# Patient Record
Sex: FEMALE | URBAN - METROPOLITAN AREA
[De-identification: names, ages, dates, MRNs, and addresses within clinical notes are randomized per-mention and may not be internally consistent; named-entity substitution may affect disease eponyms.]

---

## 2019-04-24 ENCOUNTER — APPOINTMENT (RX ONLY)
Dept: URBAN - METROPOLITAN AREA CLINIC 7 | Facility: CLINIC | Age: 39
Setting detail: DERMATOLOGY
End: 2019-04-24

## 2019-04-24 DIAGNOSIS — D18.0 HEMANGIOMA: ICD-10-CM

## 2019-04-24 DIAGNOSIS — Z87.2 PERSONAL HISTORY OF DISEASES OF THE SKIN AND SUBCUTANEOUS TISSUE: ICD-10-CM

## 2019-04-24 DIAGNOSIS — L81.4 OTHER MELANIN HYPERPIGMENTATION: ICD-10-CM

## 2019-04-24 DIAGNOSIS — L85.3 XEROSIS CUTIS: ICD-10-CM

## 2019-04-24 DIAGNOSIS — L57.3 POIKILODERMA OF CIVATTE: ICD-10-CM

## 2019-04-24 DIAGNOSIS — Q826 OTHER SPECIFIED ANOMALIES OF SKIN: ICD-10-CM

## 2019-04-24 DIAGNOSIS — Q828 OTHER SPECIFIED ANOMALIES OF SKIN: ICD-10-CM

## 2019-04-24 DIAGNOSIS — Z71.89 OTHER SPECIFIED COUNSELING: ICD-10-CM

## 2019-04-24 DIAGNOSIS — L70.8 OTHER ACNE: ICD-10-CM

## 2019-04-24 DIAGNOSIS — L98.8 OTHER SPECIFIED DISORDERS OF THE SKIN AND SUBCUTANEOUS TISSUE: ICD-10-CM

## 2019-04-24 DIAGNOSIS — Q819 OTHER SPECIFIED ANOMALIES OF SKIN: ICD-10-CM

## 2019-04-24 PROBLEM — Q82.8 OTHER SPECIFIED CONGENITAL MALFORMATIONS OF SKIN: Status: ACTIVE | Noted: 2019-04-24

## 2019-04-24 PROBLEM — D18.01 HEMANGIOMA OF SKIN AND SUBCUTANEOUS TISSUE: Status: ACTIVE | Noted: 2019-04-24

## 2019-04-24 PROBLEM — L70.0 ACNE VULGARIS: Status: ACTIVE | Noted: 2019-04-24

## 2019-04-24 PROCEDURE — ? EDUCATIONAL RESOURCES PROVIDED

## 2019-04-24 PROCEDURE — ? TREATMENT REGIMEN

## 2019-04-24 PROCEDURE — ? COUNSELING

## 2019-04-24 PROCEDURE — ? SUNSCREEN RECOMMENDATIONS

## 2019-04-24 PROCEDURE — 99203 OFFICE O/P NEW LOW 30 MIN: CPT

## 2019-04-24 PROCEDURE — ? MEDICAL CONSULTATION: DYNAMIC RHYTIDES

## 2019-04-24 PROCEDURE — ? PRESCRIPTION

## 2019-04-24 PROCEDURE — ? PRESCRIPTION SAMPLES PROVIDED

## 2019-04-24 RX ORDER — TRETINOIN 0.5 MG/G
1 CREAM TOPICAL QHS
Qty: 1 | Refills: 3 | Status: ERX | COMMUNITY
Start: 2019-04-24

## 2019-04-24 RX ORDER — TRETINOIN 0.25 MG/G
CREAM TOPICAL QHS
Qty: 1 | Refills: 6 | Status: CANCELLED

## 2019-04-24 RX ORDER — DAPSONE 75 MG/G
1 GEL TOPICAL DAILY
Qty: 1 | Refills: 5 | Status: ERX | COMMUNITY
Start: 2019-04-24

## 2019-04-24 RX ADMIN — DAPSONE 1: 75 GEL TOPICAL at 15:02

## 2019-04-24 RX ADMIN — TRETINOIN 1: 0.5 CREAM TOPICAL at 15:23

## 2019-04-24 ASSESSMENT — LOCATION SIMPLE DESCRIPTION DERM
LOCATION SIMPLE: LEFT FOREARM
LOCATION SIMPLE: LEFT THIGH
LOCATION SIMPLE: RIGHT FOREARM
LOCATION SIMPLE: LEFT POSTERIOR UPPER ARM
LOCATION SIMPLE: LEFT CHEEK
LOCATION SIMPLE: LEFT UPPER BACK
LOCATION SIMPLE: ABDOMEN
LOCATION SIMPLE: LEFT EYELID
LOCATION SIMPLE: RIGHT TEMPLE
LOCATION SIMPLE: RIGHT POSTERIOR UPPER ARM
LOCATION SIMPLE: CHEST
LOCATION SIMPLE: RIGHT CHEEK
LOCATION SIMPLE: RIGHT THIGH

## 2019-04-24 ASSESSMENT — LOCATION DETAILED DESCRIPTION DERM
LOCATION DETAILED: LEFT SUPERIOR MEDIAL UPPER BACK
LOCATION DETAILED: RIGHT INFERIOR TEMPLE
LOCATION DETAILED: LEFT ANTERIOR PROXIMAL THIGH
LOCATION DETAILED: RIGHT PROXIMAL POSTERIOR UPPER ARM
LOCATION DETAILED: LEFT ANTERIOR DISTAL THIGH
LOCATION DETAILED: LEFT LATERAL CANTHUS
LOCATION DETAILED: LEFT LATERAL BUCCAL CHEEK
LOCATION DETAILED: LEFT DISTAL DORSAL FOREARM
LOCATION DETAILED: LEFT DISTAL POSTERIOR UPPER ARM
LOCATION DETAILED: RIGHT DISTAL LATERAL POSTERIOR UPPER ARM
LOCATION DETAILED: LEFT PROXIMAL POSTERIOR UPPER ARM
LOCATION DETAILED: RIGHT CENTRAL BUCCAL CHEEK
LOCATION DETAILED: RIGHT DISTAL DORSAL FOREARM
LOCATION DETAILED: UPPER STERNUM
LOCATION DETAILED: MIDDLE STERNUM
LOCATION DETAILED: EPIGASTRIC SKIN
LOCATION DETAILED: RIGHT ANTERIOR DISTAL THIGH

## 2019-04-24 ASSESSMENT — LOCATION ZONE DERM
LOCATION ZONE: ARM
LOCATION ZONE: TRUNK
LOCATION ZONE: EYELID
LOCATION ZONE: LEG
LOCATION ZONE: FACE

## 2019-04-24 NOTE — PROCEDURE: SUNSCREEN RECOMMENDATIONS

## 2019-04-24 NOTE — PROCEDURE: PRESCRIPTION SAMPLES PROVIDED
Detail Level: Zone
Samples Given: Aczone 7.5%
Lot/Batch Number (Optional): [id number]
Expiration Date (Optional): 06/2019

## 2019-04-24 NOTE — PROCEDURE: TREATMENT REGIMEN
Continue Regimen: RA 0.05% or Amlactin qhs
Detail Level: Zone
Plan: Stressed sun protection w/ sunscreen.

## 2019-05-22 ENCOUNTER — APPOINTMENT (RX ONLY)
Dept: URBAN - METROPOLITAN AREA CLINIC 7 | Facility: CLINIC | Age: 39
Setting detail: DERMATOLOGY
End: 2019-05-22

## 2019-05-22 DIAGNOSIS — Z71.89 OTHER SPECIFIED COUNSELING: ICD-10-CM

## 2019-05-22 DIAGNOSIS — Z41.9 ENCOUNTER FOR PROCEDURE FOR PURPOSES OTHER THAN REMEDYING HEALTH STATE, UNSPECIFIED: ICD-10-CM

## 2019-05-22 PROCEDURE — ? COUNSELING

## 2019-05-22 PROCEDURE — ? BOTOX

## 2019-05-22 ASSESSMENT — LOCATION SIMPLE DESCRIPTION DERM
LOCATION SIMPLE: RIGHT FOREHEAD
LOCATION SIMPLE: RIGHT CHEEK
LOCATION SIMPLE: RIGHT TEMPLE
LOCATION SIMPLE: NOSE
LOCATION SIMPLE: LEFT FOREHEAD
LOCATION SIMPLE: LEFT TEMPLE

## 2019-05-22 ASSESSMENT — LOCATION DETAILED DESCRIPTION DERM
LOCATION DETAILED: LEFT INFERIOR FOREHEAD
LOCATION DETAILED: LEFT INFERIOR TEMPLE
LOCATION DETAILED: RIGHT INFERIOR TEMPLE
LOCATION DETAILED: NASAL DORSUM
LOCATION DETAILED: RIGHT INFERIOR FOREHEAD
LOCATION DETAILED: RIGHT INFERIOR CENTRAL MALAR CHEEK

## 2019-05-22 ASSESSMENT — LOCATION ZONE DERM
LOCATION ZONE: NOSE
LOCATION ZONE: FACE

## 2019-05-22 NOTE — PROCEDURE: BOTOX
Levator Labii Superioris Units: 0
Expiration Date (Month Year): 6/25/21
Additional Area 1 Units: 2
Detail Level: Zone
Additional Area 1 Location: Lateral brows
Consent: Written consent obtained. Risks include but not limited to lid/brow ptosis, bruising, swelling, diplopia, temporary effect, incomplete chemical denervation.
Lot #: [id number]
Forehead Units: 15
Price (Use Numbers Only, No Special Characters Or $): 0.0
Dilution (U/0.1 Cc): 5
Post-Care Instructions: Patient instructed to not lie down for 4 hours and limit physical activity for 24 hours. Patient instructed not to travel by airplane for 48 hours.
Expiration Date (Month Year): 08/2021
Lot #: [id number]
Additional Area 1 Location: upper lip
Price (Use Numbers Only, No Special Characters Or $): 26.0

## 2019-06-12 ENCOUNTER — APPOINTMENT (RX ONLY)
Dept: URBAN - METROPOLITAN AREA CLINIC 7 | Facility: CLINIC | Age: 39
Setting detail: DERMATOLOGY
End: 2019-06-12

## 2019-06-12 DIAGNOSIS — L98.8 OTHER SPECIFIED DISORDERS OF THE SKIN AND SUBCUTANEOUS TISSUE: ICD-10-CM

## 2019-06-12 PROCEDURE — ? COUNSELING

## 2019-06-12 PROCEDURE — ? COSMETIC FOLLOW-UP

## 2019-06-12 ASSESSMENT — LOCATION DETAILED DESCRIPTION DERM
LOCATION DETAILED: RIGHT INFERIOR TEMPLE
LOCATION DETAILED: INFERIOR MID FOREHEAD
LOCATION DETAILED: LEFT INFERIOR TEMPLE
LOCATION DETAILED: GLABELLA

## 2019-06-12 ASSESSMENT — LOCATION SIMPLE DESCRIPTION DERM
LOCATION SIMPLE: INFERIOR FOREHEAD
LOCATION SIMPLE: LEFT TEMPLE
LOCATION SIMPLE: RIGHT TEMPLE
LOCATION SIMPLE: GLABELLA

## 2019-06-12 ASSESSMENT — LOCATION ZONE DERM: LOCATION ZONE: FACE

## 2019-06-12 NOTE — PROCEDURE: COSMETIC FOLLOW-UP
Detail Level: Zone
Side Effects Override (Free Text): Consider forehead injections
Patient Satisfaction: Very pleased
Global Improvement: Very Good
Treatment (Optional): Botox

## 2019-07-03 ENCOUNTER — APPOINTMENT (RX ONLY)
Dept: URBAN - METROPOLITAN AREA CLINIC 7 | Facility: CLINIC | Age: 39
Setting detail: DERMATOLOGY
End: 2019-07-03

## 2019-07-03 DIAGNOSIS — Z41.9 ENCOUNTER FOR PROCEDURE FOR PURPOSES OTHER THAN REMEDYING HEALTH STATE, UNSPECIFIED: ICD-10-CM

## 2019-07-03 PROCEDURE — ? FACIAL

## 2019-07-03 ASSESSMENT — LOCATION DETAILED DESCRIPTION DERM
LOCATION DETAILED: RIGHT INFERIOR MEDIAL MALAR CHEEK
LOCATION DETAILED: LEFT INFERIOR CENTRAL MALAR CHEEK

## 2019-07-03 ASSESSMENT — LOCATION SIMPLE DESCRIPTION DERM
LOCATION SIMPLE: RIGHT CHEEK
LOCATION SIMPLE: LEFT CHEEK

## 2019-07-03 ASSESSMENT — LOCATION ZONE DERM: LOCATION ZONE: FACE

## 2019-07-03 NOTE — PROCEDURE: FACIAL
Exfoliation Type: scrub
Facial Steaming: steamed
Extraction Method: extractor
Comments (Sticky): Revision enzyme cleanser, dermapeel, Image peel with vital c mask 3 min with peel, moderate extractions, melanie well, Image mask, CE ferulic, Triple Lipid, Elta spf
Detail Level: Zone

## 2019-07-31 ENCOUNTER — APPOINTMENT (RX ONLY)
Dept: URBAN - METROPOLITAN AREA CLINIC 7 | Facility: CLINIC | Age: 39
Setting detail: DERMATOLOGY
End: 2019-07-31

## 2019-07-31 DIAGNOSIS — Z41.9 ENCOUNTER FOR PROCEDURE FOR PURPOSES OTHER THAN REMEDYING HEALTH STATE, UNSPECIFIED: ICD-10-CM

## 2019-07-31 PROCEDURE — ? FACIAL

## 2019-07-31 ASSESSMENT — LOCATION SIMPLE DESCRIPTION DERM
LOCATION SIMPLE: RIGHT CHEEK
LOCATION SIMPLE: LEFT CHEEK

## 2019-07-31 ASSESSMENT — LOCATION DETAILED DESCRIPTION DERM
LOCATION DETAILED: LEFT CENTRAL MALAR CHEEK
LOCATION DETAILED: RIGHT INFERIOR CENTRAL MALAR CHEEK

## 2019-07-31 ASSESSMENT — LOCATION ZONE DERM: LOCATION ZONE: FACE

## 2019-07-31 NOTE — PROCEDURE: FACIAL
Extraction Method: extractor
Facial Steaming: steamed
Detail Level: Zone
Comments (Sticky): Revision Enzyme cleanser, 3 step Image exfoliation, light microdermabrasion, moderate extractions, phyto mask, CE Ferulic, Epidermal repair, Elta md
Exfoliation Type: scrub

## 2019-08-28 ENCOUNTER — APPOINTMENT (RX ONLY)
Dept: URBAN - METROPOLITAN AREA CLINIC 7 | Facility: CLINIC | Age: 39
Setting detail: DERMATOLOGY
End: 2019-08-28

## 2019-08-28 DIAGNOSIS — Z41.9 ENCOUNTER FOR PROCEDURE FOR PURPOSES OTHER THAN REMEDYING HEALTH STATE, UNSPECIFIED: ICD-10-CM

## 2019-08-28 PROCEDURE — ? FACIAL

## 2019-08-28 ASSESSMENT — LOCATION DETAILED DESCRIPTION DERM
LOCATION DETAILED: RIGHT INFERIOR CENTRAL MALAR CHEEK
LOCATION DETAILED: LEFT UPPER CUTANEOUS LIP

## 2019-08-28 ASSESSMENT — LOCATION ZONE DERM
LOCATION ZONE: LIP
LOCATION ZONE: FACE

## 2019-08-28 ASSESSMENT — LOCATION SIMPLE DESCRIPTION DERM
LOCATION SIMPLE: RIGHT CHEEK
LOCATION SIMPLE: LEFT LIP

## 2019-08-28 NOTE — PROCEDURE: FACIAL
Extraction Method: extractor
Comments (Sticky): Simply clean, dermapeel, [place] enzyme 3 min, melanie well, light microdermabrasion, Image brightening peel, melanie well, image mask, phyto gel, Epidermal repair, Elta clear.
Facial Steaming: steamed
Detail Level: Zone
Comments (Non-Sticky): 3/4 facial pkg
Exfoliation Type: scrub

## 2019-09-18 ENCOUNTER — APPOINTMENT (RX ONLY)
Dept: URBAN - METROPOLITAN AREA CLINIC 27 | Facility: CLINIC | Age: 39
Setting detail: DERMATOLOGY
End: 2019-09-18

## 2019-09-18 DIAGNOSIS — Z41.9 ENCOUNTER FOR PROCEDURE FOR PURPOSES OTHER THAN REMEDYING HEALTH STATE, UNSPECIFIED: ICD-10-CM

## 2019-09-18 PROCEDURE — ? MEDICAL CONSULTATION: FILLERS

## 2019-09-18 PROCEDURE — ? OTHER

## 2019-09-18 PROCEDURE — ? BOTOX

## 2019-09-18 ASSESSMENT — LOCATION SIMPLE DESCRIPTION DERM
LOCATION SIMPLE: RIGHT FOREHEAD
LOCATION SIMPLE: RIGHT TEMPLE
LOCATION SIMPLE: SUPERIOR FOREHEAD
LOCATION SIMPLE: LEFT CHEEK
LOCATION SIMPLE: LEFT EYEBROW
LOCATION SIMPLE: RIGHT EYELID
LOCATION SIMPLE: LEFT FOREHEAD
LOCATION SIMPLE: LEFT EYELID
LOCATION SIMPLE: RIGHT EYEBROW
LOCATION SIMPLE: GLABELLA
LOCATION SIMPLE: RIGHT CHEEK

## 2019-09-18 ASSESSMENT — LOCATION DETAILED DESCRIPTION DERM
LOCATION DETAILED: RIGHT LATERAL FOREHEAD
LOCATION DETAILED: LEFT INFERIOR MEDIAL FOREHEAD
LOCATION DETAILED: LEFT SUPERIOR FOREHEAD
LOCATION DETAILED: RIGHT LATERAL EYEBROW
LOCATION DETAILED: RIGHT MEDIAL EYEBROW
LOCATION DETAILED: RIGHT MEDIAL FOREHEAD
LOCATION DETAILED: RIGHT SUPERIOR MEDIAL FOREHEAD
LOCATION DETAILED: LEFT MEDIAL FOREHEAD
LOCATION DETAILED: RIGHT FOREHEAD
LOCATION DETAILED: LEFT LATERAL EYEBROW
LOCATION DETAILED: RIGHT LATERAL CANTHUS
LOCATION DETAILED: RIGHT SUPERIOR CENTRAL MALAR CHEEK
LOCATION DETAILED: LEFT SUPERIOR CENTRAL MALAR CHEEK
LOCATION DETAILED: RIGHT SUPERIOR FOREHEAD
LOCATION DETAILED: RIGHT CENTRAL EYEBROW
LOCATION DETAILED: LEFT LATERAL CANTHUS
LOCATION DETAILED: RIGHT INFERIOR TEMPLE
LOCATION DETAILED: SUPERIOR MID FOREHEAD
LOCATION DETAILED: LEFT FOREHEAD
LOCATION DETAILED: GLABELLA
LOCATION DETAILED: RIGHT MID TEMPLE

## 2019-09-18 ASSESSMENT — LOCATION ZONE DERM
LOCATION ZONE: FACE
LOCATION ZONE: EYELID

## 2019-09-18 NOTE — PROCEDURE: BOTOX
Masseter Units: 0
Additional Area 2 Location: crows feet
Price (Use Numbers Only, No Special Characters Or $): 180
Lot #: [id number]
Additional Area 1 Location: lateral brow
Detail Level: Zone
Expiration Date (Month Year): 08/2021
Consent: Written consent obtained. Risks include but not limited to lid/brow ptosis, bruising, swelling, diplopia, temporary effect, incomplete chemical denervation.
Additional Area 2 Units: 20
Dilution (U/0.1 Cc): 5
Additional Area 1 Units: 4
Forehead Units: 10
Post-Care Instructions: Patient instructed to not lie down for 4 hours and limit physical activity for 24 hours. Patient instructed not to travel by airplane for 48 hours.
Glabellar Complex Units: 15

## 2019-09-18 NOTE — PROCEDURE: OTHER
Note Text (......Xxx Chief Complaint.): This diagnosis correlates with the
Other (Free Text): -Discussed using lyft for cheeks +/- reg resty for tear troughs
Detail Level: Zone

## 2019-10-02 ENCOUNTER — APPOINTMENT (RX ONLY)
Dept: URBAN - METROPOLITAN AREA CLINIC 7 | Facility: CLINIC | Age: 39
Setting detail: DERMATOLOGY
End: 2019-10-02

## 2019-10-02 DIAGNOSIS — Z41.9 ENCOUNTER FOR PROCEDURE FOR PURPOSES OTHER THAN REMEDYING HEALTH STATE, UNSPECIFIED: ICD-10-CM

## 2019-10-02 PROCEDURE — ? FACIAL

## 2019-10-02 PROCEDURE — ? COSMETIC FOLLOW-UP

## 2019-10-02 ASSESSMENT — LOCATION DETAILED DESCRIPTION DERM
LOCATION DETAILED: LEFT INFERIOR CENTRAL MALAR CHEEK
LOCATION DETAILED: RIGHT INFERIOR CENTRAL MALAR CHEEK

## 2019-10-02 ASSESSMENT — LOCATION ZONE DERM: LOCATION ZONE: FACE

## 2019-10-02 ASSESSMENT — LOCATION SIMPLE DESCRIPTION DERM
LOCATION SIMPLE: LEFT CHEEK
LOCATION SIMPLE: RIGHT CHEEK

## 2019-10-02 NOTE — HPI: FOLLOW UP BOTOX
Have You Had Botox?: has had prior Botox
Have You Had Dysport?: has not had Dysport
Have You Had Xeomin?: has not had Xeomin
When Was Your Last Botox Treatment?: 09/18/2019

## 2019-10-02 NOTE — PROCEDURE: FACIAL
Detail Level: Zone
Facial Steaming: steamed
Extraction Method: extractor
Comments (Non-Sticky): [facility] underarm touch up, 75x6 treatments 450.00
Comments (Sticky): Gentle cleanser, Revision scrub, pumpkin enzyme, vital c mask, moderate extractions, melanie well, Image brightening lift, phyto mask, CE Ferulic, triple lipid, elta clear tinted
Exfoliation Type: scrub

## 2019-10-02 NOTE — PROCEDURE: COSMETIC FOLLOW-UP
Treatment (Optional): Botox
Patient Satisfaction: Very pleased
Detail Level: Zone
Global Improvement: Very Good
Comments (Free Text): Happy with the forehead injections and crows feet especially.
Side Effects Or Complications: None

## 2019-11-06 ENCOUNTER — APPOINTMENT (RX ONLY)
Dept: URBAN - METROPOLITAN AREA CLINIC 7 | Facility: CLINIC | Age: 39
Setting detail: DERMATOLOGY
End: 2019-11-06

## 2019-11-06 DIAGNOSIS — Z41.9 ENCOUNTER FOR PROCEDURE FOR PURPOSES OTHER THAN REMEDYING HEALTH STATE, UNSPECIFIED: ICD-10-CM

## 2019-11-06 PROCEDURE — ? SCITON BBL

## 2019-11-06 ASSESSMENT — LOCATION DETAILED DESCRIPTION DERM
LOCATION DETAILED: LEFT INFERIOR MEDIAL MALAR CHEEK
LOCATION DETAILED: RIGHT INFERIOR CENTRAL MALAR CHEEK

## 2019-11-06 ASSESSMENT — LOCATION SIMPLE DESCRIPTION DERM
LOCATION SIMPLE: RIGHT CHEEK
LOCATION SIMPLE: LEFT CHEEK

## 2019-11-06 ASSESSMENT — LOCATION ZONE DERM: LOCATION ZONE: FACE

## 2019-11-06 NOTE — PROCEDURE: SCITON BBL
Cooling (In C): 15
Fluence (J/Cm2): 12
Repetition Rate (Hz): 20
Treatment Number: 1
Detail Level: Zone
Repetition Rate (Hz): 10
Spot Size: Finesse Adapter Size: 15 x 45 mm (No Finesse Adapter)
Consent: Written consent obtained, risks reviewed including but not limited to crusting, scabbing, blistering, scarring, darker or lighter pigmentary change, bruising, and/or incomplete response.
Cooling ?: Yes
Post-Care Instructions: I reviewed with the patient in detail post-care instructions. Patient should stay away from the sun and wear sun protection until treated areas are fully healed.
Fluence (J/Cm2): 25
Pulse Duration Units: milliseconds
Hide Repetition Rate?: No
Anesthesia Volume In Cc: 0
Spot Size: Finesse Adapter Size: 15 x 15 mm square
Preprocedure Text: The treatment areas were thoroughly cleaned. Any exposed at risk hair that was not to be treated was covered in white paper tape. Clear ultrasound gel was applied to the treatment area. The area was treated with no immediate stacking of pulses.
Fluence (J/Cm2): 14
Post Procedure Text: The patient tolerated the procedure well. Ice-chilled washclothes were applied to the treatment area for comfort. Post care was reviewed with the patient.

## 2020-01-08 ENCOUNTER — APPOINTMENT (RX ONLY)
Dept: URBAN - METROPOLITAN AREA CLINIC 7 | Facility: CLINIC | Age: 40
Setting detail: DERMATOLOGY
End: 2020-01-08

## 2020-01-08 DIAGNOSIS — Z41.9 ENCOUNTER FOR PROCEDURE FOR PURPOSES OTHER THAN REMEDYING HEALTH STATE, UNSPECIFIED: ICD-10-CM

## 2020-01-08 PROCEDURE — ? SCITON BBL

## 2020-01-08 ASSESSMENT — LOCATION SIMPLE DESCRIPTION DERM
LOCATION SIMPLE: LEFT SHOULDER
LOCATION SIMPLE: RIGHT BREAST
LOCATION SIMPLE: RIGHT CHEEK
LOCATION SIMPLE: LEFT CHEEK

## 2020-01-08 ASSESSMENT — LOCATION DETAILED DESCRIPTION DERM
LOCATION DETAILED: RIGHT INFERIOR CENTRAL MALAR CHEEK
LOCATION DETAILED: RIGHT AXILLARY TAIL OF BREAST
LOCATION DETAILED: LEFT ANTERIOR SHOULDER
LOCATION DETAILED: LEFT INFERIOR CENTRAL MALAR CHEEK

## 2020-01-08 ASSESSMENT — LOCATION ZONE DERM
LOCATION ZONE: FACE
LOCATION ZONE: ARM
LOCATION ZONE: TRUNK

## 2020-01-08 NOTE — PROCEDURE: SCITON BBL
Consent: Written consent obtained, risks reviewed including but not limited to crusting, scabbing, blistering, scarring, darker or lighter pigmentary change, bruising, and/or incomplete response.
Fluence (J/Cm2): 15
Cooling ?: Yes
Fluence (J/Cm2): 25
Spot Size: Finesse Adapter Size: 11 mm round
Post-Care Instructions: I reviewed with the patient in detail post-care instructions. Patient should stay away from the sun and wear sun protection until treated areas are fully healed.
Passes: 1
Pulse Duration Units: milliseconds
Pulse Duration: 20
Hide Repetition Rate?: No
Additional Comments (Optional): 2/4 david perez
Spot Size: Finesse Adapter Size: 15 x 45 mm (No Finesse Adapter)
Fluence (J/Cm2): 9
Repetition Rate (Hz): 10
Spot Size: Finesse Adapter Size: 15 x 15 mm square
Anesthesia Volume In Cc: 0
Preprocedure Text: The treatment areas were thoroughly cleaned. Any exposed at risk hair that was not to be treated was covered in white paper tape. Clear ultrasound gel was applied to the treatment area. The area was treated with no immediate stacking of pulses.
Post Procedure Text: The patient tolerated the procedure well. Ice-chilled washclothes were applied to the treatment area for comfort. Post care was reviewed with the patient.
Treatment Number: 2
Detail Level: Zone
Cooling (In C): 13
Location Override (Will Not Show Above If Text Entered): Axilla
Fluence (J/Cm2): 12
Additional Comments (Optional): pt purchased 4 underarm touch up treatments

## 2020-02-19 ENCOUNTER — APPOINTMENT (RX ONLY)
Dept: URBAN - METROPOLITAN AREA CLINIC 7 | Facility: CLINIC | Age: 40
Setting detail: DERMATOLOGY
End: 2020-02-19

## 2020-02-19 DIAGNOSIS — Z41.9 ENCOUNTER FOR PROCEDURE FOR PURPOSES OTHER THAN REMEDYING HEALTH STATE, UNSPECIFIED: ICD-10-CM

## 2020-02-19 PROCEDURE — ? SCITON BBL

## 2020-02-19 ASSESSMENT — LOCATION DETAILED DESCRIPTION DERM
LOCATION DETAILED: LEFT INFERIOR CENTRAL MALAR CHEEK
LOCATION DETAILED: LEFT ANTERIOR SHOULDER
LOCATION DETAILED: RIGHT INFERIOR CENTRAL MALAR CHEEK

## 2020-02-19 ASSESSMENT — LOCATION SIMPLE DESCRIPTION DERM
LOCATION SIMPLE: LEFT SHOULDER
LOCATION SIMPLE: LEFT CHEEK
LOCATION SIMPLE: RIGHT CHEEK

## 2020-02-19 ASSESSMENT — LOCATION ZONE DERM
LOCATION ZONE: FACE
LOCATION ZONE: ARM

## 2020-02-19 NOTE — PROCEDURE: SCITON BBL
Spot Size: Finesse Adapter Size: 15 x 15 mm square
Cooling ?: Yes
Consent: Written consent obtained, risks reviewed including but not limited to crusting, scabbing, blistering, scarring, darker or lighter pigmentary change, bruising, and/or incomplete response.
Spot Size: Finesse Adapter Size: 11 mm round
Detail Level: Zone
Post-Care Instructions: I reviewed with the patient in detail post-care instructions. Patient should stay away from the sun and wear sun protection until treated areas are fully healed.
Post Procedure Text: The patient tolerated the procedure well. Ice-chilled washclothes were applied to the treatment area for comfort. Post care was reviewed with the patient.
Cooling (In C): 15
Passes: 1
Pulse Duration Units: milliseconds
Preprocedure Text: The treatment areas were thoroughly cleaned. Any exposed at risk hair that was not to be treated was covered in white paper tape. Clear ultrasound gel was applied to the treatment area. The area was treated with no immediate stacking of pulses.
Pulse Duration: 18
Repetition Rate (Hz): 20
Treatment Number: 3
Anesthesia Volume In Cc: 0
Repetition Rate (Hz): 10
Hide Repetition Rate?: No
Spot Size: Finesse Adapter Size: 15 x 45 mm (No Finesse Adapter)
Fluence (J/Cm2): 25
Cooling (In C): 13
Location Override (Will Not Show Above If Text Entered): Axilla
Fluence (J/Cm2): 11
Pulse Duration: 17
Additional Comments (Optional): 2/4
Treatment Number: 2

## 2020-02-26 ENCOUNTER — APPOINTMENT (RX ONLY)
Dept: URBAN - METROPOLITAN AREA CLINIC 7 | Facility: CLINIC | Age: 40
Setting detail: DERMATOLOGY
End: 2020-02-26

## 2020-02-26 DIAGNOSIS — Z41.9 ENCOUNTER FOR PROCEDURE FOR PURPOSES OTHER THAN REMEDYING HEALTH STATE, UNSPECIFIED: ICD-10-CM

## 2020-02-26 PROCEDURE — ? BOTOX

## 2020-02-26 PROCEDURE — ? OTHER

## 2020-02-26 PROCEDURE — ? MEDICAL CONSULTATION: FILLERS

## 2020-02-26 ASSESSMENT — LOCATION SIMPLE DESCRIPTION DERM
LOCATION SIMPLE: RIGHT FOREHEAD
LOCATION SIMPLE: RIGHT EYEBROW
LOCATION SIMPLE: LEFT CHEEK
LOCATION SIMPLE: RIGHT INFERIOR EYELID
LOCATION SIMPLE: LEFT FOREHEAD
LOCATION SIMPLE: INFERIOR FOREHEAD
LOCATION SIMPLE: LEFT INFERIOR EYELID
LOCATION SIMPLE: RIGHT CHEEK
LOCATION SIMPLE: LEFT EYEBROW

## 2020-02-26 ASSESSMENT — LOCATION DETAILED DESCRIPTION DERM
LOCATION DETAILED: RIGHT INFERIOR LATERAL FOREHEAD
LOCATION DETAILED: LEFT CENTRAL MALAR CHEEK
LOCATION DETAILED: RIGHT FOREHEAD
LOCATION DETAILED: RIGHT CENTRAL MALAR CHEEK
LOCATION DETAILED: RIGHT LATERAL FOREHEAD
LOCATION DETAILED: LEFT CENTRAL EYEBROW
LOCATION DETAILED: LEFT INFERIOR LATERAL FOREHEAD
LOCATION DETAILED: LEFT LATERAL FOREHEAD
LOCATION DETAILED: LEFT SUPERIOR FOREHEAD
LOCATION DETAILED: LEFT LATERAL INFERIOR EYELID
LOCATION DETAILED: RIGHT SUPERIOR MEDIAL FOREHEAD
LOCATION DETAILED: LEFT FOREHEAD
LOCATION DETAILED: RIGHT MEDIAL EYEBROW
LOCATION DETAILED: RIGHT MEDIAL FOREHEAD
LOCATION DETAILED: LEFT INFERIOR MEDIAL FOREHEAD
LOCATION DETAILED: INFERIOR MID FOREHEAD
LOCATION DETAILED: RIGHT CENTRAL EYEBROW
LOCATION DETAILED: RIGHT LATERAL INFERIOR EYELID

## 2020-02-26 ASSESSMENT — LOCATION ZONE DERM
LOCATION ZONE: FACE
LOCATION ZONE: EYELID

## 2020-02-26 NOTE — PROCEDURE: BOTOX
Depressor Anguli Oris Units: 0
Show Additional Area 5: Yes
Detail Level: Zone
Consent: Written consent obtained. Risks include but not limited to lid/brow ptosis, bruising, swelling, diplopia, temporary effect, incomplete chemical denervation.
Show Right And Left Pupillary Line Units: No
Lot #: [id number] / barcode- [id number]
Forehead Units: 10
Dilution (U/0.1 Cc): 5
Post-Care Instructions: Patient instructed to not lie down for 4 hours and limit physical activity for 24 hours. Patient instructed not to travel by airplane for 48 hours.
Glabellar Complex Units: 15
Additional Area 1 Units: 4
Expiration Date (Month Year): 08/2022
Additional Area 1 Location: lateral brow
Reconstitution Date (Optional): 2/24/2020
Price (Use Numbers Only, No Special Characters Or $): 377.00

## 2020-02-26 NOTE — PROCEDURE: OTHER
Detail Level: Zone
Note Text (......Xxx Chief Complaint.): This diagnosis correlates with the
Other (Free Text): Her primary concern is her rhytids along her orbital rim. Discussed diff area to treat, however, we could perform trial of Restylane lyft (cheeks) x 1 and Regular Restylane x 1 (tear troughs)
Other (Free Text): Deferring crows feet today

## 2020-12-29 ENCOUNTER — APPOINTMENT (RX ONLY)
Dept: URBAN - METROPOLITAN AREA CLINIC 7 | Facility: CLINIC | Age: 40
Setting detail: DERMATOLOGY
End: 2020-12-29

## 2020-12-29 DIAGNOSIS — L98.8 OTHER SPECIFIED DISORDERS OF THE SKIN AND SUBCUTANEOUS TISSUE: ICD-10-CM

## 2020-12-29 PROCEDURE — ? BOTOX

## 2020-12-29 PROCEDURE — ? OTHER

## 2020-12-29 ASSESSMENT — LOCATION DETAILED DESCRIPTION DERM: LOCATION DETAILED: INFERIOR MID FOREHEAD

## 2020-12-29 ASSESSMENT — LOCATION ZONE DERM: LOCATION ZONE: FACE

## 2020-12-29 ASSESSMENT — LOCATION SIMPLE DESCRIPTION DERM: LOCATION SIMPLE: INFERIOR FOREHEAD

## 2020-12-29 NOTE — PROCEDURE: BOTOX
Show Ucl Units: No
Left Pupillary Line Units: 0
Show Forehead Units: Yes
Lot #: [id number]/barcode: [id number]
Consent: Written consent obtained. Risks include but not limited to lid/brow ptosis, bruising, swelling, diplopia, temporary effect, incomplete chemical denervation.
Dilution (U/0.1 Cc): 2
Forehead Units: 10
Post-Care Instructions: Patient instructed to not lie down for 4 hours and limit physical activity for 24 hours.
Reconstitution Date (Optional): 12/29/2020
Glabellar Complex Units: 20
Expiration Date (Month Year): 07/2023
Detail Level: Detailed
Price (Use Numbers Only, No Special Characters Or $): 464
Periorbital Skin Units: 6

## 2020-12-29 NOTE — PROCEDURE: OTHER
Note Text (......Xxx Chief Complaint.): This diagnosis correlates with the
Other (Free Text): , 5, 2
Detail Level: Zone

## 2021-01-08 ENCOUNTER — APPOINTMENT (RX ONLY)
Dept: URBAN - METROPOLITAN AREA CLINIC 7 | Facility: CLINIC | Age: 41
Setting detail: DERMATOLOGY
End: 2021-01-08

## 2021-01-08 DIAGNOSIS — Z41.9 ENCOUNTER FOR PROCEDURE FOR PURPOSES OTHER THAN REMEDYING HEALTH STATE, UNSPECIFIED: ICD-10-CM

## 2021-01-08 PROCEDURE — ? SCITON BBL

## 2021-01-08 ASSESSMENT — LOCATION SIMPLE DESCRIPTION DERM
LOCATION SIMPLE: RIGHT BREAST
LOCATION SIMPLE: LEFT UPPER ARM
LOCATION SIMPLE: LEFT CHEEK

## 2021-01-08 ASSESSMENT — LOCATION DETAILED DESCRIPTION DERM
LOCATION DETAILED: RIGHT AXILLARY TAIL OF BREAST
LOCATION DETAILED: LEFT INFERIOR MEDIAL MALAR CHEEK
LOCATION DETAILED: LEFT ANTERIOR PROXIMAL UPPER ARM

## 2021-01-08 ASSESSMENT — LOCATION ZONE DERM
LOCATION ZONE: FACE
LOCATION ZONE: TRUNK
LOCATION ZONE: ARM

## 2021-01-08 NOTE — PROCEDURE: SCITON BBL
Detail Level: Zone
Fluence (J/Cm2): 25
Cooling (In C): 15
Passes: 1
Pulse Duration: 20
Cooling ?: Yes
Pulse Duration Units: milliseconds
Spot Size: Finesse Adapter Size: 11 mm round
Spot Size: Finesse Adapter Size: 15 x 45 mm (No Finesse Adapter)
Anesthesia Volume In Cc: 0
Spot Size: Finesse Adapter Size: 15 x 15 mm square
Cooling (In C): 23
Preprocedure Text: The treatment areas were thoroughly cleaned. Any exposed at risk hair that was not to be treated was covered in white paper tape. Clear ultrasound gel was applied to the treatment area. The area was treated with no immediate stacking of pulses.
Consent: Written consent obtained, risks reviewed including but not limited to crusting, scabbing, blistering, scarring, darker or lighter pigmentary change, bruising, and/or incomplete response.
Fluence (J/Cm2): 10
Post-Care Instructions: I reviewed with the patient in detail post-care instructions. Patient should stay away from the sun and wear sun protection until treated areas are fully healed.
Post Procedure Text: The patient tolerated the procedure well. Ice-chilled washclothes were applied to the treatment area for comfort. Post care was reviewed with the patient.
Hide Repetition Rate?: No
Treatment Number: 4
Location Override (Will Not Show Above If Text Entered): Axilla
Treatment Number: 2

## 2021-01-12 ENCOUNTER — APPOINTMENT (RX ONLY)
Dept: URBAN - METROPOLITAN AREA CLINIC 7 | Facility: CLINIC | Age: 41
Setting detail: DERMATOLOGY
End: 2021-01-12

## 2021-01-12 DIAGNOSIS — L98.8 OTHER SPECIFIED DISORDERS OF THE SKIN AND SUBCUTANEOUS TISSUE: ICD-10-CM

## 2021-01-12 PROCEDURE — ? PRESCRIPTION

## 2021-01-12 PROCEDURE — ? COSMETIC FOLLOW-UP

## 2021-01-12 PROCEDURE — ? TREATMENT REGIMEN

## 2021-01-12 RX ORDER — TRETIONIN 0.5 MG/G
CREAM TOPICAL
Qty: 1 | Refills: 6 | Status: ERX

## 2021-01-12 ASSESSMENT — LOCATION ZONE DERM
LOCATION ZONE: FACE
LOCATION ZONE: EYELID

## 2021-01-12 ASSESSMENT — LOCATION DETAILED DESCRIPTION DERM
LOCATION DETAILED: RIGHT LATERAL CANTHUS
LOCATION DETAILED: RIGHT MEDIAL FOREHEAD
LOCATION DETAILED: LEFT SUPERIOR LATERAL MALAR CHEEK

## 2021-01-12 ASSESSMENT — LOCATION SIMPLE DESCRIPTION DERM
LOCATION SIMPLE: LEFT CHEEK
LOCATION SIMPLE: RIGHT EYELID
LOCATION SIMPLE: RIGHT FOREHEAD

## 2021-01-12 NOTE — PROCEDURE: TREATMENT REGIMEN
Initiate Treatment: Tretinoin 0.05%-Apply pea size amount to full face qohs then as skin tolerates increase to QHS
Detail Level: Zone
Plan: Consider [facility] in the future.

## 2021-01-12 NOTE — PROCEDURE: COSMETIC FOLLOW-UP
Global Improvement: Very Good
Detail Level: Zone
Side Effects Or Complications: None
Comments (Free Text): Consider more units in periorbital area and go deeper under eyes. Also consider going higher on forehead.
Patient Satisfaction: Very pleased

## 2021-03-19 ENCOUNTER — APPOINTMENT (RX ONLY)
Dept: URBAN - METROPOLITAN AREA CLINIC 7 | Facility: CLINIC | Age: 41
Setting detail: DERMATOLOGY
End: 2021-03-19

## 2021-03-19 DIAGNOSIS — Z41.9 ENCOUNTER FOR PROCEDURE FOR PURPOSES OTHER THAN REMEDYING HEALTH STATE, UNSPECIFIED: ICD-10-CM

## 2021-03-19 PROCEDURE — ? FACIAL

## 2021-03-19 ASSESSMENT — LOCATION SIMPLE DESCRIPTION DERM
LOCATION SIMPLE: RIGHT CHEEK
LOCATION SIMPLE: LEFT CHEEK

## 2021-03-19 ASSESSMENT — LOCATION ZONE DERM: LOCATION ZONE: FACE

## 2021-03-19 ASSESSMENT — LOCATION DETAILED DESCRIPTION DERM
LOCATION DETAILED: RIGHT INFERIOR MEDIAL MALAR CHEEK
LOCATION DETAILED: LEFT INFERIOR MEDIAL MALAR CHEEK

## 2021-03-19 NOTE — PROCEDURE: FACIAL
Extraction Method: extractor
Facial Steaming: steamed
Detail Level: Zone
Exfoliation Type: scrub
Comments (Sticky): Simply clean, scrub from Revision, dermapeel, moderate extractions, glycolic 20, 3 min then neutralized, b5 mask, phyto gel, epidermal repair, spf up clear tinted

## 2021-04-23 ENCOUNTER — APPOINTMENT (RX ONLY)
Dept: URBAN - METROPOLITAN AREA CLINIC 7 | Facility: CLINIC | Age: 41
Setting detail: DERMATOLOGY
End: 2021-04-23

## 2021-04-23 DIAGNOSIS — Z41.9 ENCOUNTER FOR PROCEDURE FOR PURPOSES OTHER THAN REMEDYING HEALTH STATE, UNSPECIFIED: ICD-10-CM

## 2021-04-23 PROCEDURE — ? FACIAL

## 2021-04-23 ASSESSMENT — LOCATION SIMPLE DESCRIPTION DERM
LOCATION SIMPLE: LEFT CHEEK
LOCATION SIMPLE: RIGHT CHEEK

## 2021-04-23 ASSESSMENT — LOCATION DETAILED DESCRIPTION DERM
LOCATION DETAILED: RIGHT CENTRAL MALAR CHEEK
LOCATION DETAILED: LEFT CENTRAL MALAR CHEEK

## 2021-04-23 ASSESSMENT — LOCATION ZONE DERM: LOCATION ZONE: FACE

## 2021-04-23 NOTE — PROCEDURE: FACIAL
Facial Steaming: steamed
Comments (Sticky): Simply clean, dermapeel, micro exfoliating scrub, [place] enzyme, glycolic 3 for 3 min, light extractions, epidermal repair, spf
Detail Level: Zone
Exfoliation Type: scrub
Comments (Non-Sticky): 2/4 pt purchased pt purchased 4 R bikini treatments
Extraction Method: extractor

## 2021-05-21 ENCOUNTER — APPOINTMENT (RX ONLY)
Dept: URBAN - METROPOLITAN AREA CLINIC 7 | Facility: CLINIC | Age: 41
Setting detail: DERMATOLOGY
End: 2021-05-21

## 2021-05-21 DIAGNOSIS — L98.8 OTHER SPECIFIED DISORDERS OF THE SKIN AND SUBCUTANEOUS TISSUE: ICD-10-CM

## 2021-05-21 PROCEDURE — ? BOTOX

## 2021-05-21 PROCEDURE — ? COUNSELING

## 2021-05-21 PROCEDURE — ? ADDITIONAL NOTES

## 2021-05-21 ASSESSMENT — LOCATION DETAILED DESCRIPTION DERM
LOCATION DETAILED: GLABELLA
LOCATION DETAILED: RIGHT INFERIOR TEMPLE
LOCATION DETAILED: LEFT INFERIOR TEMPLE
LOCATION DETAILED: INFERIOR MID FOREHEAD

## 2021-05-21 ASSESSMENT — LOCATION SIMPLE DESCRIPTION DERM
LOCATION SIMPLE: RIGHT TEMPLE
LOCATION SIMPLE: LEFT TEMPLE
LOCATION SIMPLE: GLABELLA
LOCATION SIMPLE: INFERIOR FOREHEAD

## 2021-05-21 ASSESSMENT — LOCATION ZONE DERM: LOCATION ZONE: FACE

## 2021-05-21 NOTE — PROCEDURE: ADDITIONAL NOTES
Render Risk Assessment In Note?: no
Detail Level: Simple
Additional Notes: Forehead 2,2,2,2,2 Glabella 2,4,8,4,2 Crow’s feet 2,2,2,2,2,2

## 2021-05-21 NOTE — PROCEDURE: BOTOX
Glabellar Complex Units: 20
R Brow Units: 0
Show Additional Area 1: Yes
Post-Care Instructions: Patient instructed to not lie down for 4 hours and limit physical activity for 24 hours.
Dilution (U/0.1 Cc): 5
Show Lcl Units: No
Detail Level: Detailed
Price (Use Numbers Only, No Special Characters Or $): 156
Consent: Written consent obtained. Risks include but not limited to lid/brow ptosis, bruising, swelling, diplopia, temporary effect, incomplete chemical denervation.
Lot #: [id number]
Periorbital Skin Units: 12
Forehead Units: 10
Expiration Date (Month Year): 12/31/2023

## 2021-06-04 ENCOUNTER — APPOINTMENT (RX ONLY)
Dept: URBAN - METROPOLITAN AREA CLINIC 7 | Facility: CLINIC | Age: 41
Setting detail: DERMATOLOGY
End: 2021-06-04

## 2021-06-04 DIAGNOSIS — Z41.9 ENCOUNTER FOR PROCEDURE FOR PURPOSES OTHER THAN REMEDYING HEALTH STATE, UNSPECIFIED: ICD-10-CM

## 2021-06-04 PROCEDURE — ? FACIAL

## 2021-06-04 PROCEDURE — ? SCITON BBL

## 2021-06-04 ASSESSMENT — LOCATION ZONE DERM
LOCATION ZONE: FACE
LOCATION ZONE: TRUNK
LOCATION ZONE: ARM
LOCATION ZONE: LEG

## 2021-06-04 ASSESSMENT — LOCATION SIMPLE DESCRIPTION DERM
LOCATION SIMPLE: CHEST
LOCATION SIMPLE: RIGHT CHEEK
LOCATION SIMPLE: LEFT SHOULDER
LOCATION SIMPLE: RIGHT THIGH
LOCATION SIMPLE: LEFT THIGH
LOCATION SIMPLE: LEFT CHEEK

## 2021-06-04 ASSESSMENT — LOCATION DETAILED DESCRIPTION DERM
LOCATION DETAILED: LEFT INFERIOR CENTRAL MALAR CHEEK
LOCATION DETAILED: RIGHT INFERIOR CENTRAL MALAR CHEEK
LOCATION DETAILED: RIGHT ANTERIOR PROXIMAL THIGH
LOCATION DETAILED: RIGHT LATERAL SUPERIOR CHEST
LOCATION DETAILED: LEFT ANTERIOR PROXIMAL THIGH
LOCATION DETAILED: LEFT ANTERIOR SHOULDER

## 2021-06-04 NOTE — PROCEDURE: FACIAL
Exfoliation Type: scrub
Facial Steaming: steamed
Extraction Method: extractor
Comments (Sticky): Simply clean, dermapeel, Image enzyme, scrub, light extractions, glycolic 20, 3 min, phyto gel mask, CE Ferulic, triple lipid, spf
Detail Level: Zone

## 2021-06-04 NOTE — PROCEDURE: SCITON BBL
Fluence (J/Cm2): 25
Passes: 1
Cooling (In C): 15
Additional Comments (Optional): bikini and underarms
Pulse Duration: 20
Post Procedure Text: The patient tolerated the procedure well. Ice-chilled washclothes were applied to the treatment area for comfort. Post care was reviewed with the patient.
Pulse Duration Units: milliseconds
Cooling ?: Yes
Spot Size: Finesse Adapter Size: 15 x 15 mm square
Consent: Written consent obtained, risks reviewed including but not limited to crusting, scabbing, blistering, scarring, darker or lighter pigmentary change, bruising, and/or incomplete response.
Treatment Number: 2
Cooling (In C): 13
Post-Care Instructions: I reviewed with the patient in detail post-care instructions. Patient should stay away from the sun and wear sun protection until treated areas are fully healed.
Location Override (Will Not Show Above If Text Entered): bikini
Hide Repetition Rate?: No
Fluence (J/Cm2): 10
Detail Level: Zone
Spot Size: Finesse Adapter Size: 15 x 45 mm (No Finesse Adapter)
Anesthesia Volume In Cc: 0
Preprocedure Text: The treatment areas were thoroughly cleaned. Any exposed at risk hair that was not to be treated was covered in white paper tape. Clear ultrasound gel was applied to the treatment area. The area was treated with no immediate stacking of pulses.

## 2021-07-16 ENCOUNTER — APPOINTMENT (RX ONLY)
Dept: URBAN - METROPOLITAN AREA CLINIC 7 | Facility: CLINIC | Age: 41
Setting detail: DERMATOLOGY
End: 2021-07-16

## 2021-07-16 DIAGNOSIS — Z41.9 ENCOUNTER FOR PROCEDURE FOR PURPOSES OTHER THAN REMEDYING HEALTH STATE, UNSPECIFIED: ICD-10-CM

## 2021-07-16 PROCEDURE — ? FACIAL

## 2021-07-16 ASSESSMENT — LOCATION DETAILED DESCRIPTION DERM
LOCATION DETAILED: RIGHT INFERIOR CENTRAL MALAR CHEEK
LOCATION DETAILED: LEFT INFERIOR MEDIAL MALAR CHEEK

## 2021-07-16 ASSESSMENT — LOCATION SIMPLE DESCRIPTION DERM
LOCATION SIMPLE: LEFT CHEEK
LOCATION SIMPLE: RIGHT CHEEK

## 2021-07-16 ASSESSMENT — LOCATION ZONE DERM: LOCATION ZONE: FACE

## 2021-07-16 NOTE — PROCEDURE: FACIAL
Extraction Method: extractor
Comments (Sticky): Simply clean, dermapeel, ormedic enzyme,microdermabrasion ,phyto mask, phyto gel, spf UV elements
Detail Level: Zone
Exfoliation Type: scrub
Facial Steaming: steamed

## 2021-11-09 ENCOUNTER — APPOINTMENT (RX ONLY)
Dept: URBAN - NONMETROPOLITAN AREA CLINIC 2 | Facility: CLINIC | Age: 41
Setting detail: DERMATOLOGY
End: 2021-11-09

## 2021-11-09 DIAGNOSIS — Z41.9 ENCOUNTER FOR PROCEDURE FOR PURPOSES OTHER THAN REMEDYING HEALTH STATE, UNSPECIFIED: ICD-10-CM

## 2021-11-09 PROCEDURE — ? FACIAL

## 2021-11-09 PROCEDURE — ? SCITON BBL

## 2021-11-09 ASSESSMENT — LOCATION SIMPLE DESCRIPTION DERM
LOCATION SIMPLE: LEFT CHEEK
LOCATION SIMPLE: LEFT THIGH

## 2021-11-09 ASSESSMENT — LOCATION ZONE DERM
LOCATION ZONE: FACE
LOCATION ZONE: LEG

## 2021-11-09 ASSESSMENT — LOCATION DETAILED DESCRIPTION DERM
LOCATION DETAILED: LEFT ANTERIOR PROXIMAL THIGH
LOCATION DETAILED: LEFT CENTRAL MALAR CHEEK

## 2021-11-09 NOTE — PROCEDURE: FACIAL
Detail Level: Zone
Extraction Method: cotton-tipped applicator
Exfoliation Type: enzyme
Mask Type (Optional): calming
Treatment Type (Optional): Deep Cleanse Treatment
Comments (Sticky): Gentle cleanser, dermapeel, extractions, PCA sensi 3 layers, ce ferulic, phyto gel, epidermal repair, UV clear.

## 2021-11-09 NOTE — PROCEDURE: SCITON BBL
Repetition Rate (Hz): 10
Fluence (J/Cm2): 25
Prepaid Number Of Treatments: 4
Cooling (In C): 15
Passes: 1
Post Procedure Text: The patient tolerated the procedure well. Post care was reviewed with the patient. Hydrocortisone applied.
Pulse Duration: 20
Treatment Number: 3
Detail Level: Zone
Cooling ?: Yes
Pulse Duration Units: milliseconds
Preprocedure Text: The treatment areas were thoroughly cleaned. Any exposed at risk hair that was not to be treated was covered in white paper tape. Clear ultrasound gel was applied to the treatment area. The area was treated with no immediate stacking of pulses.
Anesthesia Volume In Cc: 0
Spot Size: Finesse Adapter Size: 15 x 15 mm square
Spot Size: Finesse Adapter Size: 15 x 45 mm (No Finesse Adapter)
Fluence (J/Cm2): 12
Consent: Written consent obtained, risks reviewed including but not limited to crusting, scabbing, blistering, scarring, darker or lighter pigmentary change, bruising, and/or incomplete response.
Location Override (Will Not Show Above If Text Entered): bikini
Post-Care Instructions: I reviewed with the patient in detail post-care instructions. Patient should stay away from the sun and wear sun protection until treated areas are fully healed.
Hide Repetition Rate?: No

## 2021-12-30 ENCOUNTER — APPOINTMENT (RX ONLY)
Dept: URBAN - METROPOLITAN AREA CLINIC 7 | Facility: CLINIC | Age: 41
Setting detail: DERMATOLOGY
End: 2021-12-30

## 2021-12-30 DIAGNOSIS — L98.8 OTHER SPECIFIED DISORDERS OF THE SKIN AND SUBCUTANEOUS TISSUE: ICD-10-CM

## 2021-12-30 PROCEDURE — ? COUNSELING

## 2021-12-30 PROCEDURE — ? BOTOX

## 2021-12-30 PROCEDURE — ? ADDITIONAL NOTES

## 2021-12-30 ASSESSMENT — LOCATION SIMPLE DESCRIPTION DERM
LOCATION SIMPLE: RIGHT TEMPLE
LOCATION SIMPLE: INFERIOR FOREHEAD
LOCATION SIMPLE: LEFT TEMPLE
LOCATION SIMPLE: GLABELLA

## 2021-12-30 ASSESSMENT — LOCATION DETAILED DESCRIPTION DERM
LOCATION DETAILED: INFERIOR MID FOREHEAD
LOCATION DETAILED: LEFT INFERIOR TEMPLE
LOCATION DETAILED: GLABELLA
LOCATION DETAILED: RIGHT INFERIOR TEMPLE

## 2021-12-30 ASSESSMENT — LOCATION ZONE DERM: LOCATION ZONE: FACE

## 2021-12-30 NOTE — PROCEDURE: BOTOX
Glabellar Complex Units: 20
R Brow Units: 0
Show Additional Area 1: Yes
Post-Care Instructions: Patient instructed to not lie down for 4 hours and limit physical activity for 24 hours.
Dilution (U/0.1 Cc): 5
Show Lcl Units: No
Detail Level: Detailed
Price (Use Numbers Only, No Special Characters Or $): 420.00
Consent: Written consent obtained. Risks include but not limited to lid/brow ptosis, bruising, swelling, diplopia, temporary effect, incomplete chemical denervation.
Lot #: [id number]
Periorbital Skin Units: 12
Forehead Units: 10
Expiration Date (Month Year): 12/31/2023

## 2021-12-30 NOTE — PROCEDURE: ADDITIONAL NOTES
Render Risk Assessment In Note?: no
Detail Level: Simple
Additional Notes: Forehead 2,2,2,2,2 Glabella 2,4,8,4,2 Crow’s feet 2,2,2,2,2,2,2

## 2022-02-15 ENCOUNTER — APPOINTMENT (RX ONLY)
Dept: URBAN - NONMETROPOLITAN AREA CLINIC 2 | Facility: CLINIC | Age: 42
Setting detail: DERMATOLOGY
End: 2022-02-15

## 2022-02-15 DIAGNOSIS — Z41.9 ENCOUNTER FOR PROCEDURE FOR PURPOSES OTHER THAN REMEDYING HEALTH STATE, UNSPECIFIED: ICD-10-CM

## 2022-02-15 PROCEDURE — ? SCITON BBL

## 2022-02-15 ASSESSMENT — LOCATION SIMPLE DESCRIPTION DERM
LOCATION SIMPLE: RIGHT THIGH
LOCATION SIMPLE: LEFT THIGH

## 2022-02-15 ASSESSMENT — LOCATION ZONE DERM: LOCATION ZONE: LEG

## 2022-02-15 ASSESSMENT — LOCATION DETAILED DESCRIPTION DERM
LOCATION DETAILED: LEFT ANTERIOR PROXIMAL THIGH
LOCATION DETAILED: RIGHT ANTERIOR PROXIMAL THIGH

## 2022-02-15 NOTE — PROCEDURE: SCITON BBL
Cooling ?: Yes
Pulse Duration Units: milliseconds
Post-Care Instructions: I reviewed with the patient in detail post-care instructions. Patient should stay away from the sun and wear sun protection until treated areas are fully healed.
Cooling (In C): 15
Spot Size: Finesse Adapter Size: 15 x 15 mm square
Hide Repetition Rate?: No
Repetition Rate (Hz): 10
Prepaid Number Of Treatments: 1
Cooling (In C): 14
Location Override (Will Not Show Above If Text Entered): bikini
Spot Size: Finesse Adapter Size: 15 x 45 mm (No Finesse Adapter)
Detail Level: Zone
Fluence (J/Cm2): 25
Pulse Duration: 20
Anesthesia Volume In Cc: 0
Preprocedure Text: The treatment areas were thoroughly cleaned. Clear ultrasound gel was applied to the treatment area. The area was treated with no immediate stacking of pulses.
Post Procedure Text: The patient tolerated the procedure well. Post care was reviewed with the patient. Hydrocortisone applied. Mild erythema.
Consent: Written consent obtained, risks reviewed including but not limited to crusting, scabbing, blistering, scarring, darker or lighter pigmentary change, bruising, and/or incomplete response.

## 2022-03-08 ENCOUNTER — APPOINTMENT (RX ONLY)
Dept: URBAN - NONMETROPOLITAN AREA CLINIC 2 | Facility: CLINIC | Age: 42
Setting detail: DERMATOLOGY
End: 2022-03-08

## 2022-03-08 DIAGNOSIS — Z41.9 ENCOUNTER FOR PROCEDURE FOR PURPOSES OTHER THAN REMEDYING HEALTH STATE, UNSPECIFIED: ICD-10-CM

## 2022-03-08 PROCEDURE — ? SCITON BBL

## 2022-03-08 ASSESSMENT — LOCATION DETAILED DESCRIPTION DERM
LOCATION DETAILED: LEFT ANTERIOR PROXIMAL THIGH
LOCATION DETAILED: RIGHT ANTERIOR PROXIMAL THIGH

## 2022-03-08 ASSESSMENT — LOCATION ZONE DERM: LOCATION ZONE: LEG

## 2022-03-08 ASSESSMENT — LOCATION SIMPLE DESCRIPTION DERM
LOCATION SIMPLE: RIGHT THIGH
LOCATION SIMPLE: LEFT THIGH

## 2022-03-08 NOTE — PROCEDURE: SCITON BBL
Passes: 1
Fluence (J/Cm2): 25
Pulse Duration: 20
Repetition Rate (Hz): 10
Pulse Duration Units: milliseconds
Detail Level: Zone
Spot Size: Finesse Adapter Size: 15 x 15 mm square
Cooling ?: Yes
Location Override (Will Not Show Above If Text Entered): bikini area
Cooling (In C): 13
Cooling (In C): 15
Post-Care Instructions: I reviewed with the patient in detail post-care instructions. Patient should stay away from the sun and wear sun protection until treated areas are fully healed.
Consent: Written consent obtained, risks reviewed including but not limited to crusting, scabbing, blistering, scarring, darker or lighter pigmentary change, bruising, and/or incomplete response.
Spot Size: Finesse Adapter Size: 15 x 45 mm (No Finesse Adapter)
Fluence (J/Cm2): 16
Hide Repetition Rate?: No
Pulse Duration: 23
Anesthesia Volume In Cc: 0
Post Procedure Text: The patient tolerated the procedure well. Post care was reviewed with the patient. Hydrocortisone applied. Mild erythema and follicular edema. \\n*Pt is very happy with the [facility] results, especially in the last few txs as her hair growth in the bikini area has diminished to almost nothing.  \\nHowever, her last Botox injection with Dr. Rliey did not seem to work and she had requested another injection or her money back, which did not happen; pt decided she would rather not contribute more money for txs with [facility] as a result.  She was overall happy with her results from tx packages with us for years, but has unfortunately opted not to continue as a [facility] patient in future.
Preprocedure Text: The treatment areas were thoroughly cleaned. Clear ultrasound gel was applied to the treatment area. The area was treated with no immediate stacking of pulses.